# Patient Record
Sex: MALE | Race: WHITE | NOT HISPANIC OR LATINO | Employment: FULL TIME | URBAN - NONMETROPOLITAN AREA
[De-identification: names, ages, dates, MRNs, and addresses within clinical notes are randomized per-mention and may not be internally consistent; named-entity substitution may affect disease eponyms.]

---

## 2023-04-13 ENCOUNTER — OFFICE VISIT (OUTPATIENT)
Dept: UROLOGY | Facility: CLINIC | Age: 28
End: 2023-04-13
Payer: COMMERCIAL

## 2023-04-13 VITALS
WEIGHT: 143 LBS | SYSTOLIC BLOOD PRESSURE: 120 MMHG | TEMPERATURE: 98.4 F | BODY MASS INDEX: 21.18 KG/M2 | OXYGEN SATURATION: 98 % | DIASTOLIC BLOOD PRESSURE: 76 MMHG | HEIGHT: 69 IN | HEART RATE: 92 BPM

## 2023-04-13 DIAGNOSIS — Z30.09 VASECTOMY EVALUATION: Primary | ICD-10-CM

## 2023-04-13 PROCEDURE — 99203 OFFICE O/P NEW LOW 30 MIN: CPT | Performed by: UROLOGY

## 2023-04-13 NOTE — PROGRESS NOTES
"Chief Complaint  Elective sterilization    Referring provider  Referring, Self    HPI  Mr. Yuan is a 27 y.o. male who presents with desire for irreversible, elective sterilization.    He has 2 kids.    His significant other is supportive of this decision.        Past Medical History  History reviewed. No pertinent past medical history.    Past Surgical History  History reviewed. No pertinent surgical history.    Medications  No current outpatient medications on file.    Allergies  No Known Allergies    Social History  Social History     Socioeconomic History   • Marital status:    • Number of children: 2   Tobacco Use   • Smoking status: Never   • Smokeless tobacco: Never   Vaping Use   • Vaping Use: Never used   Substance and Sexual Activity   • Alcohol use: Yes     Comment: 2 weekly   • Drug use: Defer   • Sexual activity: Defer       Family History  He has no family history of prostate cancer    Review of Systems  Constitutional: Negative for fever, chills, weight loss, weight gain and malaise/fatigue.   Skin: Negative for rash.   Eyes: Negative for blurred vision.   Endocrine: No heat/cold intolerance   Cardiovascular: Negative for chest pain or dyspnea on exertion.  Respiratory: Negative for shortness of breath or wheezing.   Gastrointestinal: Negative for nausea, abdominal pain, diarrhea, constipation.   Genitourinary: Negative for new lower urinary tract symptoms, current gross hematuria or dysuria.  Musculoskeletal: Negative for back pain and joint pain.   Lymph/Heme: Negative for easily bruises / bleeds.     Physical Exam  Visit Vitals  /76   Pulse 92   Temp 98.4 °F (36.9 °C)   Ht 175.3 cm (69\")   Wt 64.9 kg (143 lb)   SpO2 98%   BMI 21.12 kg/m²     Constitutional: NAD, WDWN.   HEENT: NCAT. Conjunctivae normal.  MMM.    Cardiovascular: Regular rate.  Pulmonary/Chest: Respirations are even and non-labored bilaterally.  Abdominal: Soft. No distension, tenderness, masses or guarding. No CVA " tenderness.  Neurological: A + O x 3.  Cranial Nerves II-XII grossly intact. Normal gait.  Extremities: MERYL x 4, Warm. No clubbing.  No cyanosis.    Skin: Pink, warm and dry.  No rashes noted.  Psychiatric:  Normal mood and affect  Genitourinary  Penis: Meatus and glans normal, no penile discharge.  No rashes/lesions.    Testes: descended bilaterally, no masses, nontender to palpation. Remainder of scrotal contents normal. No hernia appreciated.  Bilateral vasa palpable    Assessment and Plan  Mr. Yuan is a 27 y.o. male who presents today requesting permanent, irreversible surgical sterilization.  He was instructed to trim his pubic hair the night prior with a clippers.  The vasectomy was discussed in detail with the patient today including the risks which are failure of the procedure, infection, bleeding, development of chronic testicular pain and the possibility of injuring adjacent structures which could potentially result in loss of the testicle.  Furthermore, the patient was told he would remain fertile following the procedure until he provided a semen analysis that showed no sperm.  The patient expressed understanding and desire to proceed.      He will be scheduled for vasectomy with nitrous at his convenience.     I spent a total of 30 minutes in total patient care, which involved direct interaction, examination, chart review, and discussion of treatment options.

## 2024-03-18 PROCEDURE — 89321 SEMEN ANAL SPERM DETECTION: CPT | Performed by: UROLOGY

## 2025-05-28 ENCOUNTER — TELEPHONE (OUTPATIENT)
Dept: PSYCHIATRY | Facility: CLINIC | Age: 30
End: 2025-05-28
Payer: COMMERCIAL

## 2025-05-29 ENCOUNTER — TRANSCRIBE ORDERS (OUTPATIENT)
Dept: PSYCHIATRY | Facility: CLINIC | Age: 30
End: 2025-05-29
Payer: COMMERCIAL

## 2025-07-01 ENCOUNTER — OFFICE VISIT (OUTPATIENT)
Dept: PSYCHIATRY | Facility: CLINIC | Age: 30
End: 2025-07-01
Payer: COMMERCIAL

## 2025-07-01 VITALS
HEIGHT: 69 IN | OXYGEN SATURATION: 98 % | SYSTOLIC BLOOD PRESSURE: 124 MMHG | DIASTOLIC BLOOD PRESSURE: 76 MMHG | WEIGHT: 162 LBS | BODY MASS INDEX: 23.99 KG/M2 | HEART RATE: 92 BPM

## 2025-07-01 DIAGNOSIS — R41.840 POOR CONCENTRATION: ICD-10-CM

## 2025-07-01 DIAGNOSIS — Z79.899 MEDICATION MANAGEMENT: ICD-10-CM

## 2025-07-01 DIAGNOSIS — Z13.39 ADHD (ATTENTION DEFICIT HYPERACTIVITY DISORDER) EVALUATION: Primary | ICD-10-CM

## 2025-07-01 DIAGNOSIS — F41.9 ANXIETY: ICD-10-CM

## 2025-07-01 NOTE — PROGRESS NOTES
"Subjective   Uche Yuan is a 29 y.o. male who presents today for initial evaluation     Chief Complaint:  poor concentration    History of Present Illness:   History of Present Illness  Uche Yuan presents to [facility] for initial evaluation. He has been dealing with bothersome symptoms that he suspects are related to possible ADHD. Denies any past evaluations and has no significant medical history.   Currently struggles with decreased focus/concentration, forgetfulness, becomes easily distracted, and sometimes makes careless mistakes, procrastinates on daily tasks at work and home. Says that if things are not seen, they are forgotten and feels that he tends to function well in chaotic environments. Says that he has never been good at cleaning from start to finish, can never seem to get one area to 100% but more able to \"tidy\" an area. Forgetfulness and inattentiveness at home can sometimes be interpreted very differently then his overall intention. Uses cleaning as example, often has intentions of going back to complete certain tasks or chores then becomes distracted or forgets.             Experiences symptoms of anxiety related to situational stressors that have been adequately managed.             PHQ-9 total score: 5, JESSICA-7 total score: 2.     Uche scored 5 on PHQ-9 and 2 on JESSICA-7. ADHD evaluation. No previous dx. or medications taken. ADHD questionnaire complete.        Past Psychiatric History: Denies any past psychiatric evaluations ore past diagnosis. No previous psychiatric hospitalizations. Denies any past suicide attempts, self-harming behavior, SI/HI or AVH.     Previous Psych Meds: None     Substance Use/Abuse: Smokes occasional cigar, denies any other substance use/abuse     Past Medical  History: No significant past medical history, Vasectomy (7/2023)     Family Psychiatric History: Brother with suspected OCD type behaviors     Social History: Lives in [place] with wife of 4 years and their 2 daughters (ages 4 and 3). 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 Born in [place], grew up in [place] with biological parents and two older siblings. Brother was 4 years old and sister was 7 years older. He was home schooled, participated in Church-based curriculum out of [place]. Did well academically and became more independent around middle school with completing course work and maintaining a structured get flexible schedule. Graduated in 2013 then attended [employer] x 4 years and obtained a bachelor's degree in early education. 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 Changed positions due to work schedule and changes at home to allow better work life balance.     The following portions of the patient's history were reviewed and updated as appropriate: allergies, current medications, past family history, past medical history, past social history, past surgical history and problem list.    Past Medical History:  History reviewed. No pertinent past medical history.    Social History     Socioeconomic History   • Marital status:    • Number of children: 2   Tobacco Use   • Smoking status: Never     Passive exposure: Never   • Smokeless tobacco: Never   Vaping Use   • Vaping status: Never Used   Substance and Sexual Activity   • Alcohol use: Yes     Comment: 2 weekly   • Drug use: Never   • Sexual activity: Defer     Family History:  History reviewed. No pertinent family history.    Past Surgical History:  History reviewed. No pertinent surgical history.    Problem List:  There is no problem list on file for this patient.    Allergy:   No Known Allergies     Current Medications:   No current outpatient medications on file.     No current facility-administered  "medications for this visit.     Review of Systems   Constitutional:  Negative for activity change, appetite change, unexpected weight gain and unexpected weight loss.   Respiratory:  Negative for shortness of breath.    Cardiovascular:  Negative for chest pain.   Psychiatric/Behavioral:  Positive for decreased concentration. Negative for sleep disturbance, suicidal ideas and depressed mood. The patient is nervous/anxious.      Physical Exam  Vitals reviewed.   Constitutional:       General: He is not in acute distress.     Appearance: Normal appearance.   Neurological:      Mental Status: He is alert.      Gait: Gait normal.   Vitals:   Blood pressure 124/76, pulse 92, height 175.3 cm (69\"), weight 73.5 kg (162 lb), SpO2 98%.    Mental Status Exam:   Hygiene:   good  Cooperation:  Cooperative  Eye Contact:  Good  Psychomotor Behavior:  Appropriate  Affect:  Full range and Appropriate  Mood: normal  Hopelessness: Denies  Speech:  Normal  Thought Process:  Goal directed and Linear  Thought Content:  Mood incongruent  Suicidal:  None  Homicidal:  None  Hallucinations:  None  Delusion:  None  Memory:  Intact  Orientation:  Person, Place, Time, and Situation  Reliability:  good  Insight:  Good  Judgement:  Good  Impulse Control:  Good    Assessment & Plan   Problems Addressed this Visit    None  Visit Diagnoses         ADHD (attention deficit hyperactivity disorder) evaluation    -  Primary    Relevant Orders    Compliance Drug Analysis, Ur - Urine, Clean Catch      Medication management        Relevant Orders    Compliance Drug Analysis, Ur - Urine, Clean Catch      Anxiety          Poor concentration              Diagnoses         Codes Comments      ADHD (attention deficit hyperactivity disorder) evaluation    -  Primary ICD-10-CM: Z13.39  ICD-9-CM: V79.8       Medication management     ICD-10-CM: Z79.899  ICD-9-CM: V58.69       Anxiety     ICD-10-CM: F41.9  ICD-9-CM: 300.00       Poor concentration     ICD-10-CM: " R41.840  ICD-9-CM: 799.51           Visit Diagnoses:    ICD-10-CM ICD-9-CM   1. ADHD (attention deficit hyperactivity disorder) evaluation  Z13.39 V79.8   2. Medication management  Z79.899 V58.69   3. Anxiety  F41.9 300.00   4. Poor concentration  R41.840 799.51     Uche presents today for initial evaluation. He struggles with poor focus/concentration that have negatively impacted life at work and home. He struggles with multiple symptoms that align with possible ADHD diagnosis based on DSM-5 criteria. ASRS positive with 6/6 in Part A and 9/10 positive in Part B. Discussed plan of care along with treatment options to include behavior modifications and medication management. Agreeable to CBT 3 today then follow up to discuss results along with treatment options.   -CPT 3 today  -Compliance UDS obtained for results to be on file at follow up.   -CSA discussed, copy sent via [employer] for review and signature.   -Reviewed previous available documentation and most recent available labs.   CHELI reviewed and is appropriate.     GOALS:  Short Term Goals: Patient will be compliant with medication, and patient will have no significant medication related side effects.  Patient will be engaged in psychotherapy as indicated.  Patient will report subjective improvement of symptoms.  Long term goals: To stabilize mood and treat/improve subjective symptoms, the patient will stay out of the hospital, the patient will be at an optimal level of functioning, and the patient will take all medications as prescribed.  The patient/guardian verbalized understanding and agreement with goals that were mutually set.    TREATMENT PLAN: Continue supportive psychotherapy efforts and medications as indicated for patient's diagnosis.  Pharmacological and Non-Pharmacological treatment options discussed during today's visit. Patient/Guardian acknowledged and verbally consented with current treatment plan and was educated on the importance of  compliance with treatment and follow-up appointments.      MEDICATION ISSUES:  Discussed medication options and treatment plan of prescribed medication as well as the risks, benefits, any black box warnings, and side effects including potential falls, possible impaired driving, and metabolic adversities among others. Patient is agreeable to call the office with any worsening of symptoms or onset of side effects, or if any concerns or questions arise.  The contact information for the office is made available to the patient. Patient is agreeable to call 911 or go to the nearest ER should they begin having any SI/HI, or if any urgent concerns arise. No medication side effects or related complaints today.     MEDS ORDERED DURING VISIT:  No orders of the defined types were placed in this encounter.      FOLLOW UP:  Return in about 4 weeks (around 7/29/2025) for Recheck.           This document has been electronically signed by ROOSEVELT Goldstein  July 1, 2025 10:43 EDT    Please note that portions of this note were completed with a voice recognition program. Efforts were made to edit dictation, but occasionally words are mistranscribed.

## 2025-07-08 LAB — DRUGS UR: NORMAL

## 2025-07-29 ENCOUNTER — OFFICE VISIT (OUTPATIENT)
Dept: PSYCHIATRY | Facility: CLINIC | Age: 30
End: 2025-07-29
Payer: COMMERCIAL

## 2025-07-29 VITALS
OXYGEN SATURATION: 98 % | SYSTOLIC BLOOD PRESSURE: 108 MMHG | DIASTOLIC BLOOD PRESSURE: 72 MMHG | BODY MASS INDEX: 23.7 KG/M2 | HEART RATE: 86 BPM | HEIGHT: 69 IN | WEIGHT: 160 LBS

## 2025-07-29 DIAGNOSIS — F90.0 ADHD (ATTENTION DEFICIT HYPERACTIVITY DISORDER), INATTENTIVE TYPE: Primary | ICD-10-CM

## 2025-07-29 DIAGNOSIS — F41.9 ANXIETY: ICD-10-CM

## 2025-07-29 RX ORDER — DEXTROAMPHETAMINE SACCHARATE, AMPHETAMINE ASPARTATE MONOHYDRATE, DEXTROAMPHETAMINE SULFATE AND AMPHETAMINE SULFATE 2.5; 2.5; 2.5; 2.5 MG/1; MG/1; MG/1; MG/1
10 CAPSULE, EXTENDED RELEASE ORAL DAILY
Qty: 30 CAPSULE | Refills: 0 | Status: SHIPPED | OUTPATIENT
Start: 2025-07-29

## 2025-07-29 NOTE — PROGRESS NOTES
Subjective   Uche Yuan is a 29 y.o. male who presents today for follow up    Chief Complaint:  ADHD    History of Present Illness:   History of Present Illness  Uche Yuan presents for follow-up to discuss medication options. His last follow-up appointment was 7/1/2025. Currently struggling with bothersome symptoms associated with ADHD diagnosis. Symptoms negatively impact life at home and work. Reports decrease in overall focus, concentration, often forgetful, becomes easily distracted, occasionally makes careless mistakes and frequently procrastinates on daily tasks at work and home. Has felt that he typically functions well in a chaotic environment, often never completes one full task that is especially bothersome with household chores/cleaning.  Has experienced several days of feeling nervous, anxious, on edge and worry. Has times of feeling down/depressed, but these symptoms have been overall managed. Voices that his family has experienced a busy weekend that has likely contributed to more symptoms with being tired and not obtaining adequate rest. Also experienced a stressful couple of weeks at home that have now improved. Typically symptoms of anxiety or depression are situational and have always been adequately managed. Typically sleeps well. Denies any current CP/SOA, SI/HI. PHQ-9 total score: 2, JESSICA-7 total score: 3.      Previous Psych Meds: None     The following portions of the patient's history were reviewed and updated as appropriate: allergies, current medications, past family history, past medical history, past social history, past surgical history and problem list.    Past Medical History:  History reviewed. No pertinent past medical history.    Social History     Socioeconomic History    Marital status:     Number of children: 2   Tobacco Use    Smoking status: Never     Passive exposure: Never    Smokeless tobacco: Never   Vaping Use    Vaping status: Never Used   Substance and  "Sexual Activity    Alcohol use: Yes     Comment: 2 weekly    Drug use: Never    Sexual activity: Defer     Family History:  History reviewed. No pertinent family history.    Past Surgical History:  History reviewed. No pertinent surgical history.    Problem List:  There is no problem list on file for this patient.    Allergy:   No Known Allergies     Current Medications:   Current Outpatient Medications   Medication Sig Dispense Refill    amphetamine-dextroamphetamine XR (Adderall XR) 10 MG 24 hr capsule Take 1 capsule by mouth Daily 30 capsule 0     No current facility-administered medications for this visit.     Review of Systems   Constitutional:  Negative for activity change, appetite change, unexpected weight gain and unexpected weight loss.   Respiratory:  Negative for shortness of breath.    Cardiovascular:  Negative for chest pain.   Psychiatric/Behavioral:  Positive for decreased concentration. Negative for sleep disturbance, suicidal ideas and depressed mood. The patient is nervous/anxious.      Physical Exam  Vitals reviewed.   Constitutional:       General: He is not in acute distress.     Appearance: Normal appearance.   Neurological:      Mental Status: He is alert.      Gait: Gait normal.     Vitals:   Blood pressure 108/72, pulse 86, height 175.3 cm (69\"), weight 72.6 kg (160 lb), SpO2 98%.    Mental Status Exam:   Hygiene:   good  Cooperation:  Cooperative  Eye Contact:  Good  Psychomotor Behavior:  Appropriate  Affect:  Full range and Appropriate  Mood: normal  Hopelessness: Denies  Speech:  Normal  Thought Process:  Goal directed and Linear  Thought Content:  Mood incongruent  Suicidal:  None  Homicidal:  None  Hallucinations:  None  Delusion:  None  Memory:  Intact  Orientation:  Person, Place, Time, and Situation  Reliability:  good  Insight:  Good  Judgement:  Good  Impulse Control:  Good    Assessment & Plan   Problems Addressed this Visit    None  Visit Diagnoses         ADHD (attention " deficit hyperactivity disorder), inattentive type    -  Primary    Relevant Medications    amphetamine-dextroamphetamine XR (Adderall XR) 10 MG 24 hr capsule      Anxiety        Relevant Medications    amphetamine-dextroamphetamine XR (Adderall XR) 10 MG 24 hr capsule          Diagnoses         Codes Comments      ADHD (attention deficit hyperactivity disorder), inattentive type    -  Primary ICD-10-CM: F90.0  ICD-9-CM: 314.00       Anxiety     ICD-10-CM: F41.9  ICD-9-CM: 300.00           Visit Diagnoses:    ICD-10-CM ICD-9-CM   1. ADHD (attention deficit hyperactivity disorder), inattentive type  F90.0 314.00   2. Anxiety  F41.9 300.00     Today's visit is follow-up to discuss medication options to better manage symptoms of ADHD. He continues to struggle with ADHD symptoms that negatively impact daily life with work and home. He meets DSM V criteria for ADHD inattentive type. ASRS was positive with 6/6 in Part A and 9/10 positive in Part B.  Discussed medication options in detail including nonstimulant versus stimulant medications. Voices interest in initiating stimulant option. Discussed requirements including routine follow-up, urine drug screen and controlled substance contract agreement, voices understanding. Will start Adderall XR 10 mg daily.     -Start Adderall XR 10 mg daily  -Reviewed previous available documentation and most recent available labs. Compliance UDS on file from 7/1/2025 is appropriate. CHELI reviewed and is appropriate. Counseled on the use of controlled substances.    The patient is being prescribed a controlled substance as part of the treatment plan. The patient/guardian has been educated of appropriate use of the medication(s), including risks and side effects such as insomnia, headache, exacerbation of tics, nervousness, irritability, overstimulation, tremor, dizziness, anorexia or change in appetite, nausea, dry mouth, constipation, diarrhea, weight loss, sexual dysfunction, psychotic  episodes, seizures, palpitations, tachycardia, hypertension, rare activation (activation of hypomania, nicolette, and/or suicidal ideations), cardiovascular adverse effects including sudden death (especially patients with pre-existing structural abnormalities often associated with a family history of cardiac disease), may slow normal growth in children, weight gain is reported but not expected, sedation is possible but activation is much more common, metabolic adversities, and overdose among others. Patient/guardian is also informed that the medication is to be used by the patient only, the medication is to be used only as directed, and the medication should not be combined with other substances unless directed by a Provider/Prescriber. The patient/guardian verbalized understanding and agreement with this in their own words and wish to continue with current treatment plan.     GOALS:  Short Term Goals: Patient will be compliant with medication, and patient will have no significant medication related side effects.  Patient will be engaged in psychotherapy as indicated.  Patient will report subjective improvement of symptoms.  Long term goals: To stabilize mood and treat/improve subjective symptoms, the patient will stay out of the hospital, the patient will be at an optimal level of functioning, and the patient will take all medications as prescribed.  The patient/guardian verbalized understanding and agreement with goals that were mutually set.    TREATMENT PLAN: Continue supportive psychotherapy efforts and medications as indicated for patient's diagnosis.  Pharmacological and Non-Pharmacological treatment options discussed during today's visit. Patient/Guardian acknowledged and verbally consented with current treatment plan and was educated on the importance of compliance with treatment and follow-up appointments.      MEDICATION ISSUES:  Discussed medication options and treatment plan of prescribed medication as well  as the risks, benefits, any black box warnings, and side effects including potential falls, possible impaired driving, and metabolic adversities among others. Patient is agreeable to call the office with any worsening of symptoms or onset of side effects, or if any concerns or questions arise.  The contact information for the office is made available to the patient. Patient is agreeable to call 911 or go to the nearest ER should they begin having any SI/HI, or if any urgent concerns arise. No medication side effects or related complaints today.     MEDS ORDERED DURING VISIT:  New Medications Ordered This Visit   Medications    amphetamine-dextroamphetamine XR (Adderall XR) 10 MG 24 hr capsule     Sig: Take 1 capsule by mouth Daily     Dispense:  30 capsule     Refill:  0     FOLLOW UP:  Return in about 4 weeks (around 8/26/2025) for Recheck, Video visit.           This document has been electronically signed by ROOSEVELT Goldstein  July 29, 2025 13:35 EDT    Please note that portions of this note were completed with a voice recognition program. Efforts were made to edit dictation, but occasionally words are mistranscribed.